# Patient Record
Sex: MALE | Race: WHITE | Employment: STUDENT | URBAN - METROPOLITAN AREA
[De-identification: names, ages, dates, MRNs, and addresses within clinical notes are randomized per-mention and may not be internally consistent; named-entity substitution may affect disease eponyms.]

---
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
## 2020-06-10 DIAGNOSIS — Z11.59 SPECIAL SCREENING EXAMINATION FOR VIRAL DISEASE: Primary | ICD-10-CM

## 2020-06-14 DIAGNOSIS — Z11.59 SPECIAL SCREENING EXAMINATION FOR VIRAL DISEASE: ICD-10-CM

## 2020-06-14 LAB
SARS-COV-2 RNA SPEC QL NAA+PROBE: NOT DETECTED
SPECIMEN SOURCE: NORMAL

## 2020-06-14 NOTE — LETTER
June 16, 2020        Saeid Gaming  [address] [address] [facility] [address]      COVID-19 Antibody, IgG   Date Value Ref Range Status   06/14/2020 Negative NEG^Negative Final     Comment:     Negative results do not rule out SARS-CoV-2 infection, particularly in those   who have been in contact with the virus.  Follow-up testing with a molecular   diagnostic should be considered to rule out infection in these individuals.  Results from antibody testing should not be used as the sole basis to diagnose   or exclude SARS-CoV-2 infection or to inform infection status.           You have tested NEGATIVE for COVID-19 antibodies. This suggests you have not had or been exposed to COVID-19. But it does not mean that for sure.     The test finds antibodies in most people 10 days after they get sick. For some people, it takes longer than 10 days for antibodies to show up. Others may never show antibodies against COVID-19, especially if they have weak immune systems.    If you have COVID-19 symptoms now, please stay home and away from others.     What is antibody testing?    This is a kind of blood test. We take a small sample of your blood, and then test it for something called  antibodies.      Your body makes antibodies to fight infection. If your blood has antibodies for a certain germ, it means you ve been infected with that germ in the past.     Sometimes, antibodies stay in your body for years after you ve had the infection. They can be there even if the germ didn t make you sick. They are a sign that your body fought off the infection.    Will this test find antibodies in everyone who s had COVID-19?    No. The test finds antibodies in most people 10 days after they get sick. For some people, it takes longer than 10 days for antibodies to show up. Others may never show antibodies against COVID-19, especially if they have weak immune systems.    What does it mean if the test finds COVID-19 antibodies?    If we find  these antibodies, it suggests:     This person has had the virus.     Their body s immune system fought the virus.     We don t know if this will help protect someone from getting COVID-19 again. Scientists are still learning about this.    What are the signs of COVID-19?    Signs of COVID-19 can appear from 2 to 14 days (up to 2 weeks) after you re infected. Some people have no symptoms or only mild symptoms. Others get very sick. The most common symptoms are:          Cough    Shortness of breath or trouble breathing  Or at least 2 of these symptoms:    Fever    Chills    Repeated shaking with chills    Muscle pain    Headache    Sore throat    Losing your sense of taste or smell    You may have other symptoms. Please contact your doctor or clinic for any symptoms that worry you.    Where can I get more information?     To learn the [facility] guidelines for staying home, please visit the [place] Department of Health website at [email or web address]/diseases/coronavirus/basics.html    To learn more about COVID-19 and how to care for yourself at home, please visit the CDC website at https://www.cdc.gov/coronavirus/2019-ncov/about/steps-when-sick.html    For more options for care at [facility], please visit our website at [email or web address]    [facility] ([facility]) COVID-19 Hotline:  [phone]

## 2020-06-15 LAB
COVID-19 ANTIBODY IGG: NEGATIVE
LAB TEST METHOD: NORMAL

## 2020-06-16 DIAGNOSIS — Z11.59 SPECIAL SCREENING EXAMINATION FOR VIRAL DISEASE: ICD-10-CM

## 2020-06-16 LAB
SARS-COV-2 RNA SPEC QL NAA+PROBE: NOT DETECTED
SPECIMEN SOURCE: NORMAL

## 2020-07-13 DIAGNOSIS — Z20.822 EXPOSURE TO 2019 NOVEL CORONAVIRUS: Primary | ICD-10-CM

## 2020-07-15 DIAGNOSIS — Z20.822 EXPOSURE TO 2019 NOVEL CORONAVIRUS: ICD-10-CM

## 2020-07-15 NOTE — LETTER
July 20, 2020        Saeid Gaming  [address] [address] [address]    This letter provides a written record that you were tested for COVID-19 on 7/15/20.       Your result was negative. This means that we didn t find the virus that causes COVID-19 in your sample. A test may show negative when you do actually have the virus. This can happen when the virus is in the early stages of infection, before you feel illness symptoms.    If you have symptoms   Stay home and away from others (self-isolate) until you meet ALL of the guidelines below:    You ve had no fever--and no medicine that reduces fever--for 3 full days (72 hours). And      Your other symptoms have gotten better. For example, your cough or breathing has improved. And     At least 10 days have passed since your symptoms started.    During this time:    Stay home. Don t go to work, school or anywhere else.     Stay in your own room, including for meals. Use your own bathroom if you can.    Stay away from others in your home. No hugging, kissing or shaking hands. No visitors.    Clean  high touch  surfaces often (doorknobs, counters, handles, etc.). Use a household cleaning spray or wipes. You can find a full list on the EPA website at www.epa.gov/pesticide-registration/list-n-disinfectants-use-against-sars-cov-2.    Cover your mouth and nose with a mask, tissue or washcloth to avoid spreading germs.    Wash your hands and face often with soap and water.    Going back to work  Check with your employer for any guidelines to follow for going back to work.    Employers: This document serves as formal notice that your employee tested negative for COVID-19, as of the testing date shown above.

## 2020-07-16 LAB
SARS-COV-2 RNA SPEC QL NAA+PROBE: NOT DETECTED
SPECIMEN SOURCE: NORMAL

## 2020-08-06 DIAGNOSIS — Z11.59 SPECIAL SCREENING EXAMINATION FOR VIRAL DISEASE: Primary | ICD-10-CM

## 2020-08-08 DIAGNOSIS — Z11.59 SPECIAL SCREENING EXAMINATION FOR VIRAL DISEASE: ICD-10-CM

## 2020-08-08 LAB
SARS-COV-2 RNA SPEC QL NAA+PROBE: NOT DETECTED
SPECIMEN SOURCE: NORMAL

## 2020-08-10 ENCOUNTER — OFFICE VISIT (OUTPATIENT)
Dept: ORTHOPEDICS | Facility: CLINIC | Age: 20
End: 2020-08-10
Payer: COMMERCIAL

## 2020-08-10 VITALS — HEIGHT: 74 IN | BODY MASS INDEX: 29.52 KG/M2 | WEIGHT: 230 LBS

## 2020-08-10 DIAGNOSIS — B07.8 OTHER VIRAL WARTS: Primary | ICD-10-CM

## 2020-08-10 ASSESSMENT — MIFFLIN-ST. JEOR: SCORE: 2123.02

## 2020-08-10 NOTE — LETTER
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  This has been present for months, possibly more.  Not painful.  He has attempted topical treatments with no clear benefit.    OBJECTIVE:  0.5 cm punctated, verrucous lesion at the distal aspect of the patellar tendon, not tender, not erythematous, multiple darkened capillary beds.    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  We did briefly discuss treatment options including topical treatments, intralesional methods, and cryotherapy.  Saeid is opting for cryotherapy, he can have this done at his earliest convenience at the [facility].    [facility]      Heri Lopez DO

## 2020-08-10 NOTE — LETTER
Date:August 17, 2020      Patient was self referred, no letter generated. Do not send.        [facility] Health Information

## 2020-08-12 NOTE — PROGRESS NOTES
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  This has been present for months, possibly more.  Not painful.  He has attempted topical treatments with no clear benefit.    OBJECTIVE:  0.5 cm punctated, verrucous lesion at the distal aspect of the patellar tendon, not tender, not erythematous, multiple darkened capillary beds.    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  We did briefly discuss treatment options including topical treatments, intralesional methods, and cryotherapy.  Saeid is opting for cryotherapy, he can have this done at [place] [facility]    Quan Lopez DO CAM

## 2020-08-15 DIAGNOSIS — Z11.59 SPECIAL SCREENING EXAMINATION FOR VIRAL DISEASE: Primary | ICD-10-CM

## 2020-08-16 DIAGNOSIS — Z11.59 SPECIAL SCREENING EXAMINATION FOR VIRAL DISEASE: ICD-10-CM

## 2020-08-17 LAB
SARS-COV-2 RNA SPEC QL NAA+PROBE: NOT DETECTED
SPECIMEN SOURCE: NORMAL

## 2020-08-19 ENCOUNTER — OFFICE VISIT (OUTPATIENT)
Dept: ORTHOPEDICS | Facility: CLINIC | Age: 20
End: 2020-08-19
Payer: COMMERCIAL

## 2020-08-19 VITALS
SYSTOLIC BLOOD PRESSURE: 125 MMHG | WEIGHT: 230 LBS | DIASTOLIC BLOOD PRESSURE: 65 MMHG | HEIGHT: 74 IN | BODY MASS INDEX: 29.52 KG/M2 | HEART RATE: 79 BPM

## 2020-08-19 DIAGNOSIS — B07.9 VIRAL WARTS, UNSPECIFIED TYPE: Primary | ICD-10-CM

## 2020-08-19 PROCEDURE — 99207 ZZC NO CHARGE LOS: CPT | Performed by: PREVENTIVE MEDICINE

## 2020-08-19 PROCEDURE — 17110 DESTRUCTION B9 LES UP TO 14: CPT | Performed by: PREVENTIVE MEDICINE

## 2020-08-19 ASSESSMENT — MIFFLIN-ST. JEOR: SCORE: 2123.02

## 2020-08-19 NOTE — PROGRESS NOTES
Saeid presents for left knee wart removal  Wart is about 0.5 cm on anterior patellar tendon area   All lesions are frozen with LN2 x3. Patient tolerated procedure well.       the wart was frozen easily three times with liquid nitrogen.  A total of 1 warts are treated today.  The etiology of common warts were discussed.  s.  Warm soapy water soaks and sanding also recommended.  The patient is to return every two weeks until all warts have    ASSESSMENT:  21 yo male with left knee WART    PLAN:  Wart was frozen and wound dressing applied  WART CARE DISCUSSED. USE OF OTC PRODUCT STARTING IN FEW DAYS. GENTLE ABRAISION WITH PUMICE STONE OR EMERY BOARD WITH GOOD HANDWASHING AFTER. RETURN IN TWO WEEKS FOR REFREEZING UNTIL RESOLVED.

## 2020-08-19 NOTE — LETTER
8/19/2020         RE: Saeid Gaming  [address] [address] [facility] [address]        Dear Colleague,    Thank you for referring your patient, Saeid Gaming, to the [facility]. Please see a copy of my visit note below.    Saeid presents for left knee wart removal  Wart is about 0.5 cm on anterior patellar tendon area   All lesions are frozen with LN2 x3. Patient tolerated procedure well.       the wart was frozen easily three times with liquid nitrogen.  A total of 1 warts are treated today.  The etiology of common warts were discussed.  s.  Warm soapy water soaks and sanding also recommended.  The patient is to return every two weeks until all warts have    ASSESSMENT:  21 yo male with left knee WART    PLAN:  Wart was frozen and wound dressing applied  WART CARE DISCUSSED. USE OF OTC PRODUCT STARTING IN FEW DAYS. GENTLE ABRAISION WITH PUMICE STONE OR EMERY BOARD WITH GOOD HANDWASHING AFTER. RETURN IN TWO WEEKS FOR REFREEZING UNTIL RESOLVED.    Again, thank you for allowing me to participate in the care of your patient.        Sincerely,        Heri Tejada MD

## 2020-08-19 NOTE — PATIENT INSTRUCTIONS
Thanks for coming today.  Ortho[facility] Medicine Clinic  [address] [place] [address]    To schedule future appointments in Ortho Clinic, you may call [phone].    To schedule ordered imaging by your provider:   Call Central Imaging [phone]    To schedule an injection ordered by your provider:  Call Central Imaging Injection scheduling line: [phone]  J[facility] available online at:  [email or web address]    Please call if any further questions or concerns ([phone]).  Clinic hours 8 am to 5 pm.    Return to clinic (call) if symptoms worsen or fail to improve.

## 2020-09-03 DIAGNOSIS — Z11.59 SPECIAL SCREENING EXAMINATION FOR VIRAL DISEASE: Primary | ICD-10-CM

## 2020-09-08 DIAGNOSIS — Z11.59 SPECIAL SCREENING EXAMINATION FOR VIRAL DISEASE: ICD-10-CM

## 2020-09-09 LAB
SARS-COV-2 RNA SPEC QL NAA+PROBE: NOT DETECTED
SPECIMEN SOURCE: NORMAL

## 2020-09-12 DIAGNOSIS — Z11.59 SPECIAL SCREENING EXAMINATION FOR VIRAL DISEASE: Primary | ICD-10-CM

## 2020-09-14 DIAGNOSIS — Z11.59 SPECIAL SCREENING EXAMINATION FOR VIRAL DISEASE: ICD-10-CM

## 2020-09-15 LAB
SARS-COV-2 RNA SPEC QL NAA+PROBE: NOT DETECTED
SPECIMEN SOURCE: NORMAL

## 2020-09-21 DIAGNOSIS — Z11.59 SPECIAL SCREENING EXAMINATION FOR VIRAL DISEASE: ICD-10-CM

## 2020-09-21 LAB
SARS-COV-2 RNA SPEC QL NAA+PROBE: ABNORMAL
SPECIMEN SOURCE: ABNORMAL

## 2020-09-24 ENCOUNTER — VIRTUAL VISIT (OUTPATIENT)
Dept: FAMILY MEDICINE | Facility: CLINIC | Age: 20
End: 2020-09-24
Payer: COMMERCIAL

## 2020-09-24 DIAGNOSIS — U07.1 INFECTION DUE TO 2019 NOVEL CORONAVIRUS: Primary | ICD-10-CM

## 2020-09-24 NOTE — LETTER
"  9/24/2020      RE: Saeid Gaming  [address] [address] [address]       Saeid Gaming is a 20 year old male who is being evaluated via a billable video visit.      The patient has been notified of following:     \"This video visit will be conducted via a call between you and your physician/provider. We have found that certain health care needs can be provided without the need for an in-person physical exam.  This service lets us provide the care you need with a video conversation.  If a prescription is necessary we can send it directly to your pharmacy.  If lab work is needed we can place an order for that and you can then stop by our lab to have the test done at a later time.    Video visits are billed at different rates depending on your insurance coverage.  Please reach out to your insurance provider with any questions.    If during the course of the call the physician/provider feels a video visit is not appropriate, you will not be charged for this service.\"    Patient has given verbal consent for Video visit?YES  How would you like to obtain your AVS? Eris                Type of service:  Video Visit     Video Start Time: 335pm  Video End Time: 349pm  Originating Location (pt. Location): Home     Distant Location (provider location):  [facility]      Platform used for Video Visit: Zoom     Video visit for pos covid test.  No major concerns.  Feels fine.  Athlete feeling good with minimal symptoms.  No SOB or chest pain.  No other concerns or questions.  Reviewed timeline of isolation.  Testing that will be done and gradual ramp up for the [employer] 21 day total post   COVID protocol.  Will continue to follow closely.  SA will call if any concerns.      Heri Orozco MD    "

## 2020-09-24 NOTE — PROGRESS NOTES
"Saeid Gaming is a 20 year old male who is being evaluated via a billable video visit.      The patient has been notified of following:     \"This video visit will be conducted via a call between you and your physician/provider. We have found that certain health care needs can be provided without the need for an in-person physical exam.  This service lets us provide the care you need with a video conversation.  If a prescription is necessary we can send it directly to your pharmacy.  If lab work is needed we can place an order for that and you can then stop by our lab to have the test done at a later time.    Video visits are billed at different rates depending on your insurance coverage.  Please reach out to your insurance provider with any questions.    If during the course of the call the physician/provider feels a video visit is not appropriate, you will not be charged for this service.\"    Patient has given verbal consent for Video visit?YES  How would you like to obtain your AVS? MyChart              "

## 2020-09-24 NOTE — LETTER
Date:October 1, 2020      Patient was self referred, no letter generated. Do not send.        [facility] Health Information

## 2020-09-30 NOTE — PROGRESS NOTES
Type of service:  Video Visit     Video Start Time: 335pm  Video End Time: 349pm  Originating Location (pt. Location): Home     Distant Location (provider location):  [facility]      Platform used for Video Visit: Zoom     Video visit for pos covid test.  No major concerns.  Feels fine.  Athlete feeling good with minimal symptoms.  No SOB or chest pain.  No other concerns or questions.  Reviewed timeline of isolation.  Testing that will be done and gradual ramp up for the [employer] 21 day total post   COVID protocol.  Will continue to follow closely.  SA will call if any concerns.

## 2020-10-01 ENCOUNTER — HOSPITAL ENCOUNTER (OUTPATIENT)
Dept: CARDIOLOGY | Facility: CLINIC | Age: 20
Discharge: HOME | End: 2020-10-01
Attending: PREVENTIVE MEDICINE | Admitting: PREVENTIVE MEDICINE
Payer: COMMERCIAL

## 2020-10-01 DIAGNOSIS — U07.1 CLINICAL DIAGNOSIS OF COVID-19: ICD-10-CM

## 2020-10-01 DIAGNOSIS — Z11.59 SPECIAL SCREENING EXAMINATION FOR VIRAL DISEASE: ICD-10-CM

## 2020-10-01 LAB — TROPONIN I SERPL-MCNC: <0.015 UG/L (ref 0–0.04)

## 2020-10-01 PROCEDURE — 36415 COLL VENOUS BLD VENIPUNCTURE: CPT | Performed by: PATHOLOGY

## 2020-10-01 PROCEDURE — 84484 ASSAY OF TROPONIN QUANT: CPT | Performed by: PATHOLOGY

## 2020-10-01 PROCEDURE — 93010 ELECTROCARDIOGRAM REPORT: CPT | Performed by: INTERNAL MEDICINE

## 2020-10-01 PROCEDURE — 93306 TTE W/DOPPLER COMPLETE: CPT | Mod: 26 | Performed by: INTERNAL MEDICINE

## 2020-10-01 PROCEDURE — 93306 TTE W/DOPPLER COMPLETE: CPT

## 2020-10-01 PROCEDURE — 86769 SARS-COV-2 COVID-19 ANTIBODY: CPT | Mod: 90 | Performed by: PATHOLOGY

## 2020-10-01 PROCEDURE — 99000 SPECIMEN HANDLING OFFICE-LAB: CPT | Performed by: PATHOLOGY

## 2020-10-02 ENCOUNTER — APPOINTMENT (OUTPATIENT)
Dept: LAB | Facility: CLINIC | Age: 20
End: 2020-10-02
Payer: COMMERCIAL

## 2020-10-02 LAB
COVID-19 ANTIBODY IGG: POSITIVE
INTERPRETATION ECG - MUSE: NORMAL
LAB TEST METHOD: ABNORMAL

## 2020-10-05 ENCOUNTER — HOSPITAL ENCOUNTER (OUTPATIENT)
Dept: CARDIOLOGY | Facility: CLINIC | Age: 20
Discharge: HOME | End: 2020-10-05
Attending: PREVENTIVE MEDICINE | Admitting: PREVENTIVE MEDICINE
Payer: COMMERCIAL

## 2020-10-05 DIAGNOSIS — U07.1 CLINICAL DIAGNOSIS OF COVID-19: ICD-10-CM

## 2020-10-05 LAB
COVID-19 ANTIBODY IGG: POSITIVE
LAB TEST METHOD: ABNORMAL

## 2020-10-05 PROCEDURE — 75561 CARDIAC MRI FOR MORPH W/DYE: CPT

## 2020-10-05 PROCEDURE — 75561 CARDIAC MRI FOR MORPH W/DYE: CPT | Mod: 26 | Performed by: INTERNAL MEDICINE

## 2020-10-05 PROCEDURE — 255N000002 HC RX 255 OP 636: Performed by: PREVENTIVE MEDICINE

## 2020-10-05 PROCEDURE — A9585 GADOBUTROL INJECTION: HCPCS | Performed by: PREVENTIVE MEDICINE

## 2020-10-05 RX ORDER — DIPHENHYDRAMINE HCL 25 MG
25 CAPSULE ORAL
Status: DISCONTINUED | OUTPATIENT
Start: 2020-10-05 | End: 2020-10-06 | Stop reason: HOSPADM

## 2020-10-05 RX ORDER — ONDANSETRON 2 MG/ML
4 INJECTION INTRAMUSCULAR; INTRAVENOUS
Status: DISCONTINUED | OUTPATIENT
Start: 2020-10-05 | End: 2020-10-06 | Stop reason: HOSPADM

## 2020-10-05 RX ORDER — AMINOPHYLLINE 25 MG/ML
100 INJECTION, SOLUTION INTRAVENOUS ONCE
Status: DISCONTINUED | OUTPATIENT
Start: 2020-10-05 | End: 2020-10-06 | Stop reason: HOSPADM

## 2020-10-05 RX ORDER — ALBUTEROL SULFATE 90 UG/1
2 AEROSOL, METERED RESPIRATORY (INHALATION) EVERY 5 MIN PRN
Status: DISCONTINUED | OUTPATIENT
Start: 2020-10-05 | End: 2020-10-06 | Stop reason: HOSPADM

## 2020-10-05 RX ORDER — ACYCLOVIR 200 MG/1
0-1 CAPSULE ORAL
Status: DISCONTINUED | OUTPATIENT
Start: 2020-10-05 | End: 2020-10-06 | Stop reason: HOSPADM

## 2020-10-05 RX ORDER — DIAZEPAM 5 MG
5 TABLET ORAL EVERY 30 MIN PRN
Status: DISCONTINUED | OUTPATIENT
Start: 2020-10-05 | End: 2020-10-06 | Stop reason: HOSPADM

## 2020-10-05 RX ORDER — METHYLPREDNISOLONE SODIUM SUCCINATE 125 MG/2ML
125 INJECTION, POWDER, LYOPHILIZED, FOR SOLUTION INTRAMUSCULAR; INTRAVENOUS
Status: DISCONTINUED | OUTPATIENT
Start: 2020-10-05 | End: 2020-10-06 | Stop reason: HOSPADM

## 2020-10-05 RX ORDER — GADOBUTROL 604.72 MG/ML
14 INJECTION INTRAVENOUS ONCE
Status: COMPLETED | OUTPATIENT
Start: 2020-10-05 | End: 2020-10-05

## 2020-10-05 RX ORDER — DIPHENHYDRAMINE HYDROCHLORIDE 50 MG/ML
25-50 INJECTION INTRAMUSCULAR; INTRAVENOUS
Status: DISCONTINUED | OUTPATIENT
Start: 2020-10-05 | End: 2020-10-06 | Stop reason: HOSPADM

## 2020-10-05 RX ORDER — REGADENOSON 0.08 MG/ML
0.4 INJECTION, SOLUTION INTRAVENOUS ONCE
Status: DISCONTINUED | OUTPATIENT
Start: 2020-10-05 | End: 2020-10-06 | Stop reason: HOSPADM

## 2020-10-05 RX ADMIN — GADOBUTROL 14 ML: 604.72 INJECTION INTRAVENOUS at 16:47

## 2020-10-05 NOTE — PROGRESS NOTES
Noelle Mtz MD  P  [employer]             CORE - T2 mapping    Contrast administered per weight based protocol.

## 2020-10-06 ENCOUNTER — OFFICE VISIT (OUTPATIENT)
Dept: FAMILY MEDICINE | Facility: CLINIC | Age: 20
End: 2020-10-06
Payer: COMMERCIAL

## 2020-10-06 VITALS
HEART RATE: 84 BPM | DIASTOLIC BLOOD PRESSURE: 77 MMHG | SYSTOLIC BLOOD PRESSURE: 119 MMHG | HEIGHT: 75 IN | WEIGHT: 240 LBS | BODY MASS INDEX: 29.84 KG/M2

## 2020-10-06 DIAGNOSIS — U07.1 CLINICAL DIAGNOSIS OF COVID-19: Primary | ICD-10-CM

## 2020-10-06 ASSESSMENT — MIFFLIN-ST. JEOR: SCORE: 2184.26

## 2020-10-06 NOTE — LETTER
10/6/2020      RE: Saeid Gaming  [address] [address] [facility] [address] [facility] [address]       HISTORY OF PRESENT ILLNESS  Mr. Gaming is a pleasant 20 year old year old male who follows up for covid positive testing  Asymptomatic  Completed isolation  Feels well  No fevers or chills  No concerns today  Completed cardiac screening    MEDICAL HISTORY  There is no problem list on file for this patient.      Current Outpatient Medications   Medication Sig Dispense Refill     azithromycin (ZITHROMAX) 250 MG tablet Two tablets first day, then one tablet daily for four days. 6 tablet 0     cetirizine-psuedoePHEDrine (ZYRTEC-D) 5-120 MG per 12 hr tablet Take 1 tablet by mouth 2 times daily       fluticasone (FLONASE) 50 MCG/ACT spray Spray 1-2 sprays into both nostrils daily 1 Bottle 11     fluticasone (VERAMYST) 27.5 MCG/SPRAY spray Spray 2 sprays into both nostrils daily       montelukast (SINGULAIR) 10 MG tablet Take 1 tablet (10 mg) by mouth At Bedtime 90 tablet 1     oseltamivir (TAMIFLU) 75 MG capsule Take 1 capsule (75 mg) by mouth 2 times daily 10 capsule 0     tobramycin (TOBREX) 0.3 % ophthalmic solution Place 1-2 drops Into the left eye every 2 hours 1 Bottle 0       Allergies   Allergen Reactions     Penicillins Hives     Seasonal Allergies        No family history on file.  Social History     Socioeconomic History     Marital status: Single     Spouse name: Not on file     Number of children: Not on file     Years of education: Not on file     Highest education level: Not on file   Occupational History     Not on file   Social Needs     Financial resource strain: Not on file     Food insecurity     Worry: Not on file     Inability: Not on file     Transportation needs     Medical: Not on file     Non-medical: Not on file   Tobacco Use     Smoking status: Never Smoker     Smokeless tobacco: Never Used   Substance and Sexual Activity     Alcohol use: Not on file     Drug use: Not on file     Sexual activity: Not on file  "  Lifestyle     Physical activity     Days per week: Not on file     Minutes per session: Not on file     Stress: Not on file   Relationships     Social connections     Talks on phone: Not on file     Gets together: Not on file     Attends [employer] service: Not on file     Active member of club or organization: Not on file     Attends meetings of clubs or organizations: Not on file     Relationship status: Not on file     Intimate partner violence     Fear of current or ex partner: Not on file     Emotionally abused: Not on file     Physically abused: Not on file     Forced sexual activity: Not on file   Other Topics Concern     Not on file   Social History Narrative     Not on file       Additional medical/Social/Surgical histories reviewed in [facility] and updated as appropriate.     REVIEW OF SYSTEMS (10/6/2020)  10 point ROS of systems including Constitutional, Eyes, Respiratory, Cardiovascular, Gastroenterology, Genitourinary, Integumentary, Musculoskeletal, Psychiatric, Allergic/Immunologic were all negative except for pertinent positives noted in my HPI.     PHYSICAL EXAM  Vitals:    10/06/20 0703   BP: 119/77   Pulse: 84   Weight: 108.9 kg (240 lb)   Height: 1.905 m (6' 3\")     Exam:  Constitutional: healthy, alert and no distress  Head: Normocephalic. No masses, lesions, tenderness or abnormalities  Neck: Neck supple. No adenopathy. Thyroid symmetric, normal size,, Carotids without bruits.  ENT: ENT exam normal, no neck nodes or sinus tenderness  Cardiovascular: negative, PMI normal. No lifts, heaves, or thrills. RRR. No murmurs, clicks gallops or rub  Respiratory: negative, Percussion normal. Good diaphragmatic excursion. Lungs clear  Gastrointestinal: Abdomen soft, non-tender. BS normal. No masses, organomegaly  Musculoskeletal: extremities normal- no gross deformities noted, gait normal and normal muscle tone  Skin: no suspicious lesions or rashes  Neurologic: Gait normal. Reflexes normal and symmetric. " Sensation grossly WNL.  Psychiatric: mentation appears normal and affect normal/bright  Hematologic/Lymphatic/Immunologic: Normal cervical lymph nodes  ASSESSMENT & PLAN  21 yo male with history of covid positive testing and positive serology  Asymptomatic  Completed isolation  Completed cardiac screening, WNL  Feels well  No complaints today  F/u PRN  Discussed RTP per [facility] protocol for post covid infection    Appropriate PPE was utilized for prevention of spread of Covid-19.  Heri Tejada MD, CAM        Heri Tejada MD

## 2020-10-06 NOTE — LETTER
Date:October 7, 2020      Patient was self referred, no letter generated. Do not send.        [facility] Health Information

## 2020-10-06 NOTE — PROGRESS NOTES
HISTORY OF PRESENT ILLNESS  Mr. Gaming is a pleasant 20 year old year old male who follows up for covid positive testing  Asymptomatic  Completed isolation  Feels well  No fevers or chills  No concerns today  Completed cardiac screening    MEDICAL HISTORY  There is no problem list on file for this patient.      Current Outpatient Medications   Medication Sig Dispense Refill     azithromycin (ZITHROMAX) 250 MG tablet Two tablets first day, then one tablet daily for four days. 6 tablet 0     cetirizine-psuedoePHEDrine (ZYRTEC-D) 5-120 MG per 12 hr tablet Take 1 tablet by mouth 2 times daily       fluticasone (FLONASE) 50 MCG/ACT spray Spray 1-2 sprays into both nostrils daily 1 Bottle 11     fluticasone (VERAMYST) 27.5 MCG/SPRAY spray Spray 2 sprays into both nostrils daily       montelukast (SINGULAIR) 10 MG tablet Take 1 tablet (10 mg) by mouth At Bedtime 90 tablet 1     oseltamivir (TAMIFLU) 75 MG capsule Take 1 capsule (75 mg) by mouth 2 times daily 10 capsule 0     tobramycin (TOBREX) 0.3 % ophthalmic solution Place 1-2 drops Into the left eye every 2 hours 1 Bottle 0       Allergies   Allergen Reactions     Penicillins Hives     Seasonal Allergies        No family history on file.  Social History     Socioeconomic History     Marital status: Single     Spouse name: Not on file     Number of children: Not on file     Years of education: Not on file     Highest education level: Not on file   Occupational History     Not on file   Social Needs     Financial resource strain: Not on file     Food insecurity     Worry: Not on file     Inability: Not on file     Transportation needs     Medical: Not on file     Non-medical: Not on file   Tobacco Use     Smoking status: Never Smoker     Smokeless tobacco: Never Used   Substance and Sexual Activity     Alcohol use: Not on file     Drug use: Not on file     Sexual activity: Not on file   Lifestyle     Physical activity     Days per week: Not on file     Minutes per session:  "Not on file     Stress: Not on file   Relationships     Social connections     Talks on phone: Not on file     Gets together: Not on file     Attends [employer]ism service: Not on file     Active member of club or organization: Not on file     Attends meetings of clubs or organizations: Not on file     Relationship status: Not on file     Intimate partner violence     Fear of current or ex partner: Not on file     Emotionally abused: Not on file     Physically abused: Not on file     Forced sexual activity: Not on file   Other Topics Concern     Not on file   Social History Narrative     Not on file       Additional medical/Social/Surgical histories reviewed in [facility] and updated as appropriate.     REVIEW OF SYSTEMS (10/6/2020)  10 point ROS of systems including Constitutional, Eyes, Respiratory, Cardiovascular, Gastroenterology, Genitourinary, Integumentary, Musculoskeletal, Psychiatric, Allergic/Immunologic were all negative except for pertinent positives noted in my HPI.     PHYSICAL EXAM  Vitals:    10/06/20 0703   BP: 119/77   Pulse: 84   Weight: 108.9 kg (240 lb)   Height: 1.905 m (6' 3\")     Exam:  Constitutional: healthy, alert and no distress  Head: Normocephalic. No masses, lesions, tenderness or abnormalities  Neck: Neck supple. No adenopathy. Thyroid symmetric, normal size,, Carotids without bruits.  ENT: ENT exam normal, no neck nodes or sinus tenderness  Cardiovascular: negative, PMI normal. No lifts, heaves, or thrills. RRR. No murmurs, clicks gallops or rub  Respiratory: negative, Percussion normal. Good diaphragmatic excursion. Lungs clear  Gastrointestinal: Abdomen soft, non-tender. BS normal. No masses, organomegaly  Musculoskeletal: extremities normal- no gross deformities noted, gait normal and normal muscle tone  Skin: no suspicious lesions or rashes  Neurologic: Gait normal. Reflexes normal and symmetric. Sensation grossly WNL.  Psychiatric: mentation appears normal and affect " normal/bright  Hematologic/Lymphatic/Immunologic: Normal cervical lymph nodes  ASSESSMENT & PLAN  19 yo male with history of covid positive testing and positive serology  Asymptomatic  Completed isolation  Completed cardiac screening, WNL  Feels well  No complaints today  F/u PRN  Discussed RTP per [facility] protocol for post covid infection    Appropriate PPE was utilized for prevention of spread of Covid-19.  Heri Tejada MD, CAQSM

## 2020-12-17 DIAGNOSIS — Z11.59 SPECIAL SCREENING EXAMINATION FOR VIRAL DISEASE: ICD-10-CM

## 2020-12-18 LAB
COVID-19 ANTIBODY IGG: POSITIVE
LAB TEST METHOD: ABNORMAL

## 2021-01-03 ENCOUNTER — HEALTH MAINTENANCE LETTER (OUTPATIENT)
Age: 21
End: 2021-01-03

## 2021-02-03 ENCOUNTER — OFFICE VISIT (OUTPATIENT)
Dept: ORTHOPEDICS | Facility: CLINIC | Age: 21
End: 2021-02-03
Payer: COMMERCIAL

## 2021-02-03 DIAGNOSIS — J30.89 NON-SEASONAL ALLERGIC RHINITIS, UNSPECIFIED TRIGGER: Primary | ICD-10-CM

## 2021-02-03 RX ORDER — MONTELUKAST SODIUM 10 MG/1
10 TABLET ORAL AT BEDTIME
Qty: 30 TABLET | Refills: 3 | Status: SHIPPED | OUTPATIENT
Start: 2021-02-03 | End: 2021-08-16

## 2021-02-03 RX ORDER — CETIRIZINE HYDROCHLORIDE, PSEUDOEPHEDRINE HYDROCHLORIDE 5; 120 MG/1; MG/1
1 TABLET, FILM COATED, EXTENDED RELEASE ORAL 2 TIMES DAILY
Qty: 60 TABLET | Refills: 3 | Status: SHIPPED | OUTPATIENT
Start: 2021-02-03 | End: 2021-08-16

## 2021-02-03 RX ORDER — FLUTICASONE PROPIONATE 50 MCG
1 SPRAY, SUSPENSION (ML) NASAL DAILY
Qty: 11.1 ML | Refills: 3 | Status: SHIPPED | OUTPATIENT
Start: 2021-02-03

## 2021-02-03 NOTE — PROGRESS NOTES
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  Saeid has a long history of allergic rhinitis.  He feels symptoms of nasal swelling, drainage of the back of his throat, and a scratchy throat with phlegm production.  These are worse in the cold months and the transition months of fall and spring.  He has tried multiple treatment regimens over the years and has found that he has got great efficacy with various things, based upon the seasons.  He takes Zyrtec-D, which helps with the majority of his symptoms.  He has been told to take this twice a day on occasion, during seasons when his allergies are heavy.  He has also tried Flonase, he was on this for over a year in high school.  This worked well for him.  He has also tried Singulair, although not many months.  He is interested in getting back on these.        Additional history: as documented    MEDICAL HISTORY  There is no problem list on file for this patient.      Current Outpatient Medications   Medication Sig Dispense Refill     azithromycin (ZITHROMAX) 250 MG tablet Two tablets first day, then one tablet daily for four days. 6 tablet 0     cetirizine-psuedoePHEDrine (ZYRTEC-D) 5-120 MG per 12 hr tablet Take 1 tablet by mouth 2 times daily       fluticasone (FLONASE) 50 MCG/ACT spray Spray 1-2 sprays into both nostrils daily 1 Bottle 11     fluticasone (VERAMYST) 27.5 MCG/SPRAY spray Spray 2 sprays into both nostrils daily       montelukast (SINGULAIR) 10 MG tablet Take 1 tablet (10 mg) by mouth At Bedtime 90 tablet 1     oseltamivir (TAMIFLU) 75 MG capsule Take 1 capsule (75 mg) by mouth 2 times daily 10 capsule 0     tobramycin (TOBREX) 0.3 % ophthalmic solution Place 1-2 drops Into the left eye every 2 hours 1 Bottle 0       Allergies   Allergen Reactions     Penicillins Hives     Seasonal Allergies        No family history on file.    Additional medical/Social/Surgical histories reviewed in [facility] and  updated as appropriate.        PHYSICAL EXAM  There were no vitals filed for this visit.  Physical Exam  Constitutional:       Appearance: Normal appearance.   HENT:      Nose: No congestion or rhinorrhea.      Mouth/Throat:      Mouth: Mucous membranes are moist.      Pharynx: Posterior oropharyngeal erythema present. No oropharyngeal exudate.   Eyes:      Conjunctiva/sclera: Conjunctivae normal.   Cardiovascular:      Rate and Rhythm: Normal rate and regular rhythm.      Pulses: Normal pulses.      Heart sounds: Normal heart sounds.   Pulmonary:      Effort: Pulmonary effort is normal.   Skin:     Capillary Refill: Capillary refill takes less than 2 seconds.   Neurological:      General: No focal deficit present.      Mental Status: He is alert.   Psychiatric:         Mood and Affect: Mood normal.         Behavior: Behavior normal.                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    I had a good discussion with Saeid centering around treatment regimens for allergic rhinitis.  His treatment regimen is working well, he does know what works for him in certain months.  I am refilling his Zyrtec-D for him, I also placed him prescriptions for Flonase and Singulair.  If these are working well we can follow-up as needed for these and other issues.    It was a pleasure seeing Saeid today.    Heri Lopez DO, CAM  Primary Care Sports Medicine      This note was constructed using Dragon dictation software, please excuse any minor errors in spelling, grammar, or syntax.

## 2021-02-03 NOTE — LETTER
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  Saeid has a long history of allergic rhinitis.  He feels symptoms of nasal swelling, drainage of the back of his throat, and a scratchy throat with phlegm production.  These are worse in the cold months and the transition months of fall and spring.  He has tried multiple treatment regimens over the years and has found that he has got great efficacy with various things, based upon the seasons.  He takes Zyrtec-D, which helps with the majority of his symptoms.  He has been told to take this twice a day on occasion, during seasons when his allergies are heavy.  He has also tried Flonase, he was on this for over a year in high school.  This worked well for him.  He has also tried Singulair, although not many months.  He is interested in getting back on these.        Additional history: as documented    MEDICAL HISTORY  There is no problem list on file for this patient.      Current Outpatient Medications   Medication Sig Dispense Refill     azithromycin (ZITHROMAX) 250 MG tablet Two tablets first day, then one tablet daily for four days. 6 tablet 0     cetirizine-psuedoePHEDrine (ZYRTEC-D) 5-120 MG per 12 hr tablet Take 1 tablet by mouth 2 times daily       fluticasone (FLONASE) 50 MCG/ACT spray Spray 1-2 sprays into both nostrils daily 1 Bottle 11     fluticasone (VERAMYST) 27.5 MCG/SPRAY spray Spray 2 sprays into both nostrils daily       montelukast (SINGULAIR) 10 MG tablet Take 1 tablet (10 mg) by mouth At Bedtime 90 tablet 1     oseltamivir (TAMIFLU) 75 MG capsule Take 1 capsule (75 mg) by mouth 2 times daily 10 capsule 0     tobramycin (TOBREX) 0.3 % ophthalmic solution Place 1-2 drops Into the left eye every 2 hours 1 Bottle 0       Allergies   Allergen Reactions     Penicillins Hives     Seasonal Allergies        No family history  on file.    Additional medical/Social/Surgical histories reviewed in [facility] and updated as appropriate.        PHYSICAL EXAM  There were no vitals filed for this visit.  Physical Exam  Constitutional:       Appearance: Normal appearance.   HENT:      Nose: No congestion or rhinorrhea.      Mouth/Throat:      Mouth: Mucous membranes are moist.      Pharynx: Posterior oropharyngeal erythema present. No oropharyngeal exudate.   Eyes:      Conjunctiva/sclera: Conjunctivae normal.   Cardiovascular:      Rate and Rhythm: Normal rate and regular rhythm.      Pulses: Normal pulses.      Heart sounds: Normal heart sounds.   Pulmonary:      Effort: Pulmonary effort is normal.   Skin:     Capillary Refill: Capillary refill takes less than 2 seconds.   Neurological:      General: No focal deficit present.      Mental Status: He is alert.   Psychiatric:         Mood and Affect: Mood normal.         Behavior: Behavior normal.                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    I had a good discussion with Saeid centering around treatment regimens for allergic rhinitis.  His treatment regimen is working well, he does know what works for him in certain months.  I am refilling his Zyrtec-D for him, I also placed him prescriptions for Flonase and Singulair.  If these are working well we can follow-up as needed for these and other issues.    It was a pleasure seeing Saeid today.    Heri Lopez DO, CAM  Primary Care Sports Medicine      This note was constructed using Dragon dictation software, please excuse any minor errors in spelling, grammar, or syntax.        Heri Lopez DO

## 2021-03-05 ENCOUNTER — VIRTUAL VISIT (OUTPATIENT)
Dept: FAMILY MEDICINE | Facility: CLINIC | Age: 21
End: 2021-03-05
Payer: COMMERCIAL

## 2021-03-05 DIAGNOSIS — J01.00 ACUTE NON-RECURRENT MAXILLARY SINUSITIS: Primary | ICD-10-CM

## 2021-03-05 NOTE — LETTER
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    ROS:  As noted above in HPI otherwise negative.    There is no problem list on file for this patient.  c    Current Outpatient Medications   Medication Sig Dispense Refill     azithromycin (ZITHROMAX Z-FAB) 250 MG tablet Take 1 tablet (250 mg) by mouth daily for 5 days Take Z-pack as directed 5 tablet 0     azithromycin (ZITHROMAX) 250 MG tablet Two tablets first day, then one tablet daily for four days. 6 tablet 0     cetirizine-pseudoePHEDrine ER (ZYRTEC-D) 5-120 MG 12 hr tablet Take 1 tablet by mouth 2 times daily 60 tablet 3     cetirizine-psuedoePHEDrine (ZYRTEC-D) 5-120 MG per 12 hr tablet Take 1 tablet by mouth 2 times daily       fluticasone (FLONASE) 50 MCG/ACT nasal spray Spray 1 spray into both nostrils daily 11.1 mL 3     fluticasone (FLONASE) 50 MCG/ACT spray Spray 1-2 sprays into both nostrils daily 1 Bottle 11     fluticasone (VERAMYST) 27.5 MCG/SPRAY spray Spray 2 sprays into both nostrils daily       montelukast (SINGULAIR) 10 MG tablet Take 1 tablet (10 mg) by mouth At Bedtime 30 tablet 3     montelukast (SINGULAIR) 10 MG tablet Take 1 tablet (10 mg) by mouth At Bedtime 90 tablet 1     oseltamivir (TAMIFLU) 75 MG capsule Take 1 capsule (75 mg) by mouth 2 times daily 10 capsule 0     tobramycin (TOBREX) 0.3 % ophthalmic solution Place 1-2 drops Into the left eye every 2 hours 1 Bottle 0              OBJECTIVE:     There were no vitals taken for  "this visit.  Estimated body mass index is 30.12 kg/m  as calculated from the following:    Height as of an earlier encounter on 3/5/21: 1.905 m (6' 3\").    Weight as of an earlier encounter on 3/5/21: 109.3 kg (241 lb).      GENERAL: Healthy, alert and no distress  EYES: Eyes grossly normal to inspection.  No discharge or erythema, or obvious scleral/conjunctival abnormalities.  RESP: No audible wheeze, cough, or visible cyanosis.  No visible retractions or increased work of breathing.    SKIN: Visible skin clear. No significant rash, abnormal pigmentation or lesions.  NEURO: Cranial nerves grossly intact.  Mentation and speech appropriate for age.  PSYCH: Mentation appears normal, affect normal/bright, judgement and insight intact, normal speech and appearance well-groomed.             ASSESSMENT & PLAN:      22 yo male with sinusitis maxillary and congestion  Restart flonase, mucinex d and Z pack sent as RX  Stay well hydrated  F/u in 2-3 days if not improving    Options for treatment and/or follow-up care were reviewed with the patient and Morgan. Patient was actively involved in the decision making process. Patient verbalized understanding and was in agreement with the plan.    The patient was seen by and discussed with ATC    Heri Tejada MD        -------------------------------------------------------------------------------------------------------------  Video-Visit Details    Type of service:  Video Visit    Video Start Time: 9:49am    Video End Time (time video stopped): 10:00am    Total Visit time: 11    Originating Location (pt. Location): Home     Distant Location (provider location):  [facility]      Platform used for Video Visit: vel Tejada MD    "

## 2021-03-05 NOTE — LETTER
Date:March 8, 2021      Patient was self referred, no letter generated. Do not send.        [facility] Health Information

## 2021-03-06 NOTE — PROGRESS NOTES
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    ROS:  As noted above in HPI otherwise negative.    There is no problem list on file for this patient.  c    Current Outpatient Medications   Medication Sig Dispense Refill     azithromycin (ZITHROMAX Z-AFB) 250 MG tablet Take 1 tablet (250 mg) by mouth daily for 5 days Take Z-pack as directed 5 tablet 0     azithromycin (ZITHROMAX) 250 MG tablet Two tablets first day, then one tablet daily for four days. 6 tablet 0     cetirizine-pseudoePHEDrine ER (ZYRTEC-D) 5-120 MG 12 hr tablet Take 1 tablet by mouth 2 times daily 60 tablet 3     cetirizine-psuedoePHEDrine (ZYRTEC-D) 5-120 MG per 12 hr tablet Take 1 tablet by mouth 2 times daily       fluticasone (FLONASE) 50 MCG/ACT nasal spray Spray 1 spray into both nostrils daily 11.1 mL 3     fluticasone (FLONASE) 50 MCG/ACT spray Spray 1-2 sprays into both nostrils daily 1 Bottle 11     fluticasone (VERAMYST) 27.5 MCG/SPRAY spray Spray 2 sprays into both nostrils daily       montelukast (SINGULAIR) 10 MG tablet Take 1 tablet (10 mg) by mouth At Bedtime 30 tablet 3     montelukast (SINGULAIR) 10 MG tablet Take 1 tablet (10 mg) by mouth At Bedtime 90 tablet 1     oseltamivir (TAMIFLU) 75 MG capsule Take 1 capsule (75 mg) by mouth 2 times daily 10 capsule 0     tobramycin (TOBREX) 0.3 % ophthalmic solution Place 1-2 drops Into the left eye every 2 hours 1 Bottle 0              OBJECTIVE:     There were no vitals taken for this visit.  Estimated body mass index is 30.12 kg/m  as calculated from the  "following:    Height as of an earlier encounter on 3/5/21: 1.905 m (6' 3\").    Weight as of an earlier encounter on 3/5/21: 109.3 kg (241 lb).      GENERAL: Healthy, alert and no distress  EYES: Eyes grossly normal to inspection.  No discharge or erythema, or obvious scleral/conjunctival abnormalities.  RESP: No audible wheeze, cough, or visible cyanosis.  No visible retractions or increased work of breathing.    SKIN: Visible skin clear. No significant rash, abnormal pigmentation or lesions.  NEURO: Cranial nerves grossly intact.  Mentation and speech appropriate for age.  PSYCH: Mentation appears normal, affect normal/bright, judgement and insight intact, normal speech and appearance well-groomed.             ASSESSMENT & PLAN:      22 yo male with sinusitis maxillary and congestion  Restart flonase, mucinex d and Z pack sent as RX  Stay well hydrated  F/u in 2-3 days if not improving    Options for treatment and/or follow-up care were reviewed with the patient and , Morgan. Patient was actively involved in the decision making process. Patient verbalized understanding and was in agreement with the plan.    The patient was seen by and discussed with ATC    Heri Tejada MD        -------------------------------------------------------------------------------------------------------------  Video-Visit Details    Type of service:  Video Visit    Video Start Time: 9:49am    Video End Time (time video stopped): 10:00am    Total Visit time: 11    Originating Location (pt. Location): Home     Distant Location (provider location):  [facility]      Platform used for Video Visit: vel"

## 2021-06-11 ENCOUNTER — OFFICE VISIT (OUTPATIENT)
Dept: ORTHOPEDICS | Facility: CLINIC | Age: 21
End: 2021-06-11
Payer: COMMERCIAL

## 2021-06-11 DIAGNOSIS — B07.9 VIRAL WARTS, UNSPECIFIED TYPE: Primary | ICD-10-CM

## 2021-06-11 PROCEDURE — 17110 DESTRUCTION B9 LES UP TO 14: CPT | Performed by: PREVENTIVE MEDICINE

## 2021-06-11 NOTE — PROGRESS NOTES
Saeid presents for left knee wart  He has had this in the past. It has returned.  Has had this wart treated in the past about 11 months prior. Started to return recently    Wart is about 0.5 cm on anterior patellar tendon area   All lesions are frozen with LN2 x3. Patient tolerated procedure well.         the wart was frozen easily three times with liquid nitrogen.  A total of 1 warts are treated today.  The etiology of common warts were discussed.  s.  Warm soapy water soaks and sanding also recommended.  The patient is to return every two weeks until all warts have     ASSESSMENT:  19 yo male with left knee WART for removal     PLAN:  Wart was frozen and wound dressing applied  WART CARE DISCUSSED. USE OF OTC PRODUCT STARTING IN FEW DAYS. GENTLE ABRAISION WITH PUMICE STONE OR EMERY BOARD WITH GOOD HANDWASHING AFTER. RETURN IN TWO WEEKS FOR REFREEZING UNTIL RESOLVED.    Dr Tejada

## 2021-06-11 NOTE — LETTER
6/11/2021      RE: Saeid Gaming  [address]  [place] [address]       Saeid presents for left knee wart  He has had this in the past. It has returned.  Has had this wart treated in the past about 11 months prior. Started to return recently    Wart is about 0.5 cm on anterior patellar tendon area   All lesions are frozen with LN2 x3. Patient tolerated procedure well.         the wart was frozen easily three times with liquid nitrogen.  A total of 1 warts are treated today.  The etiology of common warts were discussed.  s.  Warm soapy water soaks and sanding also recommended.  The patient is to return every two weeks until all warts have     ASSESSMENT:  21 yo male with left knee WART for removal     PLAN:  Wart was frozen and wound dressing applied  WART CARE DISCUSSED. USE OF OTC PRODUCT STARTING IN FEW DAYS. GENTLE ABRAISION WITH PUMICE STONE OR EMERY BOARD WITH GOOD HANDWASHING AFTER. RETURN IN TWO WEEKS FOR REFREEZING UNTIL RESOLVED.    Dr Terrell Tejada MD

## 2021-06-11 NOTE — LETTER
Date:Yuridia 15, 2021      Patient was self referred, no letter generated. Do not send.        [facility] Health Information

## 2021-08-25 ENCOUNTER — OFFICE VISIT (OUTPATIENT)
Dept: ORTHOPEDICS | Facility: CLINIC | Age: 21
End: 2021-08-25
Payer: COMMERCIAL

## 2021-08-25 DIAGNOSIS — B07.9 VIRAL WART ON FINGER: ICD-10-CM

## 2021-08-25 DIAGNOSIS — J02.9 ACUTE PHARYNGITIS, UNSPECIFIED ETIOLOGY: Primary | ICD-10-CM

## 2021-08-25 DIAGNOSIS — B07.8 OTHER VIRAL WARTS: ICD-10-CM

## 2021-08-25 PROCEDURE — 17110 DESTRUCTION B9 LES UP TO 14: CPT | Performed by: PREVENTIVE MEDICINE

## 2021-08-25 PROCEDURE — 99207 PR DROP WITH A PROCEDURE: CPT | Performed by: PREVENTIVE MEDICINE

## 2021-08-25 RX ORDER — AZITHROMYCIN 250 MG/1
TABLET, FILM COATED ORAL
Qty: 6 TABLET | Refills: 0 | Status: SHIPPED | OUTPATIENT
Start: 2021-08-25 | End: 2021-08-30

## 2021-08-25 NOTE — LETTER
8/25/2021         RE: Saeid Gaming  [address] [place] [facility] [address]        Dear Colleague,    Thank you for referring your patient, Saeid Gaming, to the [facility] [place]. Please see a copy of my visit note below.    Saeid presents for left knee wart and left 2nd digit viral wart cryotreatment  He has had the knee wart in the past. It has returned. His finger wart is smaller and has been present for the past month      Wart on knee is about 0.5 cm on anterior patellar tendon area  Left finger wart is 0.25cm diameter near DIP joint   All lesions are frozen with LN2 x3. Patient tolerated procedure well.         the warts were frozen easily three times with liquid nitrogen.  A total of 2 warts are treated today.  The etiology of common warts were discussed.   Warm soapy water soaks and sanding also recommended.  The patient is to return every 4 weeks until all warts have     ASSESSMENT:  19 yo male with left knee and left finger  WART for removal     PLAN:  Warts were frozen and wound dressing applied  WART CARE DISCUSSED. USE OF OTC PRODUCT STARTING IN FEW DAYS. GENTLE ABRAISION WITH PUMICE STONE OR EMERY BOARD WITH GOOD HANDWASHING AFTER. RETURN IN 4 WEEKS FOR REFREEZING UNTIL RESOLVED.    Dr Tejada      Again, thank you for allowing me to participate in the care of your patient.        Sincerely,        Heri Tejada MD

## 2021-08-25 NOTE — PROGRESS NOTES
Saeid presents for left knee wart and left 2nd digit viral wart cryotreatment  He has had the knee wart in the past. It has returned. His finger wart is smaller and has been present for the past month      Wart on knee is about 0.5 cm on anterior patellar tendon area  Left finger wart is 0.25cm diameter near DIP joint   All lesions are frozen with LN2 x3. Patient tolerated procedure well.         the warts were frozen easily three times with liquid nitrogen.  A total of 2 warts are treated today.  The etiology of common warts were discussed.   Warm soapy water soaks and sanding also recommended.  The patient is to return every 4 weeks until all warts have     ASSESSMENT:  19 yo male with left knee and left finger  WART for removal     PLAN:  Warts were frozen and wound dressing applied  WART CARE DISCUSSED. USE OF OTC PRODUCT STARTING IN FEW DAYS. GENTLE ABRAISION WITH PUMICE STONE OR EMERY BOARD WITH GOOD HANDWASHING AFTER. RETURN IN 4 WEEKS FOR REFREEZING UNTIL RESOLVED.    Dr Tejada

## 2021-10-10 ENCOUNTER — HEALTH MAINTENANCE LETTER (OUTPATIENT)
Age: 21
End: 2021-10-10

## 2021-11-09 DIAGNOSIS — J30.89 NON-SEASONAL ALLERGIC RHINITIS, UNSPECIFIED TRIGGER: ICD-10-CM

## 2021-11-09 RX ORDER — MONTELUKAST SODIUM 10 MG/1
10 TABLET ORAL AT BEDTIME
Qty: 30 TABLET | Refills: 3 | Status: SHIPPED | OUTPATIENT
Start: 2021-11-09 | End: 2022-02-13

## 2021-11-16 ENCOUNTER — OFFICE VISIT (OUTPATIENT)
Dept: ORTHOPEDICS | Facility: CLINIC | Age: 21
End: 2021-11-16
Payer: COMMERCIAL

## 2021-11-16 VITALS — WEIGHT: 245 LBS | BODY MASS INDEX: 30.62 KG/M2

## 2021-11-16 DIAGNOSIS — M79.641 RIGHT HAND PAIN: Primary | ICD-10-CM

## 2021-11-16 NOTE — LETTER
11/16/2021      RE: Saeid Gaming  [address]  [facility] [address]       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 Has describes some vague numbness over the dorsum of his right hand. Seems to have some numbness that goes into the thumb and index finger on the radial aspect of his right hand on the dorsal side. Little bit of swelling in that area. No specific pain. He is doing everything.    Examination today shows full range of motion of his wrist. Full pronation supination. Normal digital motion. Some tenderness is noted at the second metacarpal phalangeal joint. There is a trace amount of swelling in this area    Fluoroscopic images obtained today show no fractures dislocations. He does show an old injury to the first metacarpal consistent with a stent or lesion in an old UCL avulsion. He has no instability to this area and no pain. It appears chronic.    Clinical assessment: Contusion right hand with subsequent numbness.    Plan: Long discussion with the patient. Reviewed the diagnosis. He is cleared to play. Ice anti-inflammatory rehab as necessary.      Soto Robles MD

## 2021-11-17 NOTE — PROGRESS NOTES
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 Has describes some vague numbness over the dorsum of his right hand. Seems to have some numbness that goes into the thumb and index finger on the radial aspect of his right hand on the dorsal side. Little bit of swelling in that area. No specific pain. He is doing everything.    Examination today shows full range of motion of his wrist. Full pronation supination. Normal digital motion. Some tenderness is noted at the second metacarpal phalangeal joint. There is a trace amount of swelling in this area    Fluoroscopic images obtained today show no fractures dislocations. He does show an old injury to the first metacarpal consistent with a stent or lesion in an old UCL avulsion. He has no instability to this area and no pain. It appears chronic.    Clinical assessment: Contusion right hand with subsequent numbness.    Plan: Long discussion with the patient. Reviewed the diagnosis. He is cleared to play. Ice anti-inflammatory rehab as necessary.

## 2022-01-29 ENCOUNTER — HEALTH MAINTENANCE LETTER (OUTPATIENT)
Age: 22
End: 2022-01-29

## 2022-02-13 DIAGNOSIS — J30.89 NON-SEASONAL ALLERGIC RHINITIS, UNSPECIFIED TRIGGER: ICD-10-CM

## 2022-02-13 RX ORDER — MONTELUKAST SODIUM 10 MG/1
10 TABLET ORAL AT BEDTIME
Qty: 30 TABLET | Refills: 3 | Status: SHIPPED | OUTPATIENT
Start: 2022-02-13 | End: 2022-06-02

## 2022-02-13 RX ORDER — CETIRIZINE HYDROCHLORIDE, PSEUDOEPHEDRINE HYDROCHLORIDE 5; 120 MG/1; MG/1
1 TABLET, FILM COATED, EXTENDED RELEASE ORAL 2 TIMES DAILY
Qty: 60 TABLET | Refills: 3 | Status: SHIPPED | OUTPATIENT
Start: 2022-02-13 | End: 2022-06-30

## 2022-02-23 ENCOUNTER — OFFICE VISIT (OUTPATIENT)
Dept: ORTHOPEDICS | Facility: CLINIC | Age: 22
End: 2022-02-23
Payer: COMMERCIAL

## 2022-02-23 VITALS — WEIGHT: 245 LBS | HEIGHT: 75 IN | BODY MASS INDEX: 30.46 KG/M2

## 2022-02-23 DIAGNOSIS — B07.8 OTHER VIRAL WARTS: Primary | ICD-10-CM

## 2022-02-23 DIAGNOSIS — L85.9 HYPERKERATOSIS: ICD-10-CM

## 2022-02-23 DIAGNOSIS — B07.9 VIRAL WARTS, UNSPECIFIED TYPE: ICD-10-CM

## 2022-02-23 DIAGNOSIS — B07.9 VIRAL WART ON FINGER: ICD-10-CM

## 2022-02-23 PROCEDURE — 99207 PR DROP WITH A PROCEDURE: CPT | Performed by: PREVENTIVE MEDICINE

## 2022-02-23 PROCEDURE — 17110 DESTRUCTION B9 LES UP TO 14: CPT | Performed by: PREVENTIVE MEDICINE

## 2022-02-23 NOTE — LETTER
2/23/2022         RE: Saeid Gaming  [address]  [facility] [address]        Dear Colleague,    Thank you for referring your patient, Saeid Gaming, to the [facility] [place]. Please see a copy of my visit note below.    Saeid presents for left knee wart and left 2nd digit viral wart cryotreatment as it has returned  He has also had knee wart in the past. It has returned. His finger wart is smaller and has been present for the past month      Wart on knee is about 0.5 cm on anterior patellar tendon area  Left finger wart is 0.25cm diameter near DIP joint   All lesions are frozen with LN2 x3. Patient tolerated procedure well.         the warts were frozen easily three times with liquid nitrogen.  A total of 2 warts are treated today.  The etiology of common warts were discussed.   Warm soapy water soaks and sanding also recommended.  The patient is to return every 4 weeks until all warts have    Skin: has two areas of raised hyperkeratotic lesions <0.3cm on right inner arm     ASSESSMENT:  21 yo male with left knee and left finger  WART for removal     PLAN:  Warts were frozen and wound dressing applied  WART CARE DISCUSSED. USE OF OTC PRODUCT STARTING IN FEW DAYS. GENTLE ABRAISION WITH PUMICE STONE OR EMERY BOARD WITH GOOD HANDWASHING AFTER. RETURN IN 4 WEEKS FOR REFREEZING UNTIL RESOLVED.      Dr Tejada      Again, thank you for allowing me to participate in the care of your patient.        Sincerely,        Heri Tejada MD

## 2022-02-23 NOTE — PROGRESS NOTES
Saeid presents for left knee wart and left 2nd digit viral wart cryotreatment as it has returned  He has also had knee wart in the past. It has returned. His finger wart is smaller and has been present for the past month      Wart on knee is about 0.5 cm on anterior patellar tendon area  Left finger wart is 0.25cm diameter near DIP joint   All lesions are frozen with LN2 x3. Patient tolerated procedure well.         the warts were frozen easily three times with liquid nitrogen.  A total of 2 warts are treated today.  The etiology of common warts were discussed.   Warm soapy water soaks and sanding also recommended.  The patient is to return every 4 weeks until all warts have    Skin: has two areas of raised hyperkeratotic lesions <0.3cm on right inner arm     ASSESSMENT:  21 yo male with left knee and left finger  WART for removal     PLAN:  Warts were frozen and wound dressing applied  WART CARE DISCUSSED. USE OF OTC PRODUCT STARTING IN FEW DAYS. GENTLE ABRAISION WITH PUMICE STONE OR EMERY BOARD WITH GOOD HANDWASHING AFTER. RETURN IN 4 WEEKS FOR REFREEZING UNTIL RESOLVED.      Dr Tejada

## 2022-05-30 DIAGNOSIS — J30.89 NON-SEASONAL ALLERGIC RHINITIS, UNSPECIFIED TRIGGER: ICD-10-CM

## 2022-06-02 RX ORDER — MONTELUKAST SODIUM 10 MG/1
TABLET ORAL
Qty: 90 TABLET | Refills: 1 | Status: SHIPPED | OUTPATIENT
Start: 2022-06-02

## 2022-06-26 DIAGNOSIS — J30.89 NON-SEASONAL ALLERGIC RHINITIS, UNSPECIFIED TRIGGER: ICD-10-CM

## 2022-06-30 RX ORDER — CETIRIZINE HCL 10 MG
TABLET ORAL
Qty: 60 TABLET | Refills: 3 | Status: SHIPPED | OUTPATIENT
Start: 2022-06-30 | End: 2022-11-23

## 2022-09-17 ENCOUNTER — HEALTH MAINTENANCE LETTER (OUTPATIENT)
Age: 22
End: 2022-09-17

## 2022-09-25 ENCOUNTER — OFFICE VISIT (OUTPATIENT)
Dept: ORTHOPEDICS | Facility: CLINIC | Age: 22
End: 2022-09-25
Payer: COMMERCIAL

## 2022-09-25 VITALS — BODY MASS INDEX: 30.46 KG/M2 | WEIGHT: 245 LBS | HEIGHT: 75 IN

## 2022-09-25 DIAGNOSIS — M79.646 CHRONIC THUMB PAIN, UNSPECIFIED LATERALITY: Primary | ICD-10-CM

## 2022-09-25 DIAGNOSIS — G89.29 CHRONIC THUMB PAIN, UNSPECIFIED LATERALITY: Primary | ICD-10-CM

## 2022-09-25 NOTE — LETTER
Date:September 27, 2022      Patient was self referred, no letter generated. Do not send.        [facility] Health Information

## 2022-09-25 NOTE — LETTER
9/25/2022      RE: Saeid Gaming  [address] [facility] [address]     Dear Colleague,    Thank you for referring your patient, Saeid Gaming, to the [facility]. Please see a copy of my visit note below.    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  He localizes the pain to the base of the thumb, and approximately the CMC joint.  He iced overnight.  He is not having significant swelling today.  He is not having difficulty with  strength.  He has never had any injury to this before.  He did have a previous bony UCL injury to the MCP joint of the thumb that went on to heal uneventfully.    O:  RUE: No pain to palpation of the distal phalanx proximal phalanx or metacarpal.  Mild pain to palpation globally around the CMC joint.  No pain to palpation over the MCP joint.  5/5 flexion and extension at the MCP, CMC, and IP joint of the thumb.  No instability to varus valgus stress at the MCP joint.  No instability of the CMC joint to varus and valgus stress.    Imaging: Three-view x-ray of the thumb base shows no fracture or acute bony pathology at the base of the metacarpal or of the trapezium.  No dislocation or subluxation noted.  Previous injury to the MCP joint was visualized and is stable.    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  No bony pathology seen on x-ray.  No instability noted.  Patient can return to play as pain allows.  Recommend taping for practice and game play.  Anti-inflammatories and icing.  We will continue to monitor.    Germán Goodwin MD    Patient seen and examined with the fellow. I also personally reviewed the images and interpreted the imaging myself.     Assesment: Thumb contusion, no fracture    Plan: Taper a splint play as able.    I agree with history, physical and imaging as well as the  assessment and plan as detailed by Dr. Goodwin.         Again, thank you for allowing me to participate in the care of your patient.      Sincerely,    Soto Robles MD

## 2022-09-25 NOTE — PROGRESS NOTES
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  He localizes the pain to the base of the thumb, and approximately the CMC joint.  He iced overnight.  He is not having significant swelling today.  He is not having difficulty with  strength.  He has never had any injury to this before.  He did have a previous bony UCL injury to the MCP joint of the thumb that went on to heal uneventfully.    O:  RUE: No pain to palpation of the distal phalanx proximal phalanx or metacarpal.  Mild pain to palpation globally around the CMC joint.  No pain to palpation over the MCP joint.  5/5 flexion and extension at the MCP, CMC, and IP joint of the thumb.  No instability to varus valgus stress at the MCP joint.  No instability of the CMC joint to varus and valgus stress.    Imaging: Three-view x-ray of the thumb base shows no fracture or acute bony pathology at the base of the metacarpal or of the trapezium.  No dislocation or subluxation noted.  Previous injury to the MCP joint was visualized and is stable.    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  No bony pathology seen on x-ray.  No instability noted.  Patient can return to play as pain allows.  Recommend taping for practice and game play.  Anti-inflammatories and icing.  We will continue to monitor.    Germán Goodwin MD

## 2022-09-26 NOTE — PROGRESS NOTES
Patient seen and examined with the fellow. I also personally reviewed the images and interpreted the imaging myself.     Assesment: Thumb contusion, no fracture    Plan: Taper a splint play as able.    I agree with history, physical and imaging as well as the assessment and plan as detailed by Dr. Goodwin.

## 2022-11-06 ENCOUNTER — OFFICE VISIT (OUTPATIENT)
Dept: ORTHOPEDICS | Facility: CLINIC | Age: 22
End: 2022-11-06
Payer: COMMERCIAL

## 2022-11-06 DIAGNOSIS — S53.442A ELBOW SPRAIN, ULNAR COLLATERAL LIGAMENT, LEFT, INITIAL ENCOUNTER: Primary | ICD-10-CM

## 2022-11-06 RX ORDER — KETOROLAC TROMETHAMINE 10 MG/1
10 TABLET, FILM COATED ORAL EVERY 6 HOURS PRN
Qty: 5 TABLET | Refills: 0 | Status: SHIPPED | OUTPATIENT
Start: 2022-11-06

## 2022-11-06 RX ORDER — DICLOFENAC SODIUM 75 MG/1
75 TABLET, DELAYED RELEASE ORAL 2 TIMES DAILY
Qty: 60 TABLET | Refills: 0 | Status: SHIPPED | OUTPATIENT
Start: 2022-11-06

## 2022-11-06 NOTE — LETTER
11/6/2022      RE: Saeid Gaming  [address] [facility] [address]     Dear Colleague,    Thank you for referring your patient, Saeid Gaming, to the [facility]. Please see a copy of my visit note below.    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  He states he injured it in the game yesterday.  During the play in question he has elbow extended when an opposing player hit him on the elbow from the radial side.  He felt the elbow going to a valgus moment.  He denies dislocating the elbow.  He was evaluated by athletic training staff on the sidelines.  He was able to maintain full range of motion and strength without instability.  He was able to complete the remainder of the game.  Today he is having pain on the medial and posterior aspect of the elbow.  He does not feel any gross instability.  He has full range of motion.    O:  RUE: 0 to 150 degrees range of motion of the elbow.  Mild pain to palpation of the tip the olecranon.  Mild pain to palpation over the inferior aspect of the medial epicondyle of the humerus as well as the medial aspect of the ulna.  No pain to palpation over the radial head or neck.  No pain to palpation over the lateral condyle of the humerus.  5/5 flexion and extension.  No gross instability to valgus stress at 0 and 30 degrees elbow flexion although there is guarding at 30 degrees.  No instability varus stress.  Positive milking maneuver for pain and feeling of instability the medial aspect of the elbow.    Imaging:   Three-view x-ray of the right elbow under mini C arm was performed.  This shows reduction of the ulnohumeral joint.  There is also reduction of the radiocapitellar joint on all views.  Normal joint line.  No fracture noted of the olecranon, coronoid, or radial neck.  No acute bony pathology noted.    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  We discussed with the patient the diagnosis and expected outcome.  We discussed with him that given that he is a nonthrowing athlete and not having any instability we would recommend nonoperative management of this injury.  He has full strength and range of motion at this time.  He is cleared to return to play with bracing as he is able to tolerate.  We will order him twice daily Voltaren p.o. as needed for pain.  We will also prescribe him Toradol as needed for game day.  We will continue to monitor him for this injury throughout the remainder of the season.    Germán Goodwin MD    Patient seen and examined with the resident. I also personally reviewed the images and interpreted the imaging myself.     Assesment: right UCL strain    Plan: nsaids, brace, play when able    I agree with history, physical and imaging as well as the assessment and plan as detailed by Dr. Tavarez.         Again, thank you for allowing me to participate in the care of your patient.      Sincerely,    Soto Robles MD

## 2022-11-06 NOTE — LETTER
Date:November 8, 2022      Patient was self referred, no letter generated. Do not send.        [facility] Health Information

## 2022-11-07 NOTE — PROGRESS NOTES
Patient seen and examined with the resident. I also personally reviewed the images and interpreted the imaging myself.     Assesment: right UCL strain    Plan: nsaids, brace, play when able    I agree with history, physical and imaging as well as the assessment and plan as detailed by Dr. Tavarez.

## 2022-11-07 NOTE — PROGRESS NOTES
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  He states he injured it in the game yesterday.  During the play in question he has elbow extended when an opposing player hit him on the elbow from the radial side.  He felt the elbow going to a valgus moment.  He denies dislocating the elbow.  He was evaluated by athletic training staff on the sidelines.  He was able to maintain full range of motion and strength without instability.  He was able to complete the remainder of the game.  Today he is having pain on the medial and posterior aspect of the elbow.  He does not feel any gross instability.  He has full range of motion.    O:  RUE: 0 to 150 degrees range of motion of the elbow.  Mild pain to palpation of the tip the olecranon.  Mild pain to palpation over the inferior aspect of the medial epicondyle of the humerus as well as the medial aspect of the ulna.  No pain to palpation over the radial head or neck.  No pain to palpation over the lateral condyle of the humerus.  5/5 flexion and extension.  No gross instability to valgus stress at 0 and 30 degrees elbow flexion although there is guarding at 30 degrees.  No instability varus stress.  Positive milking maneuver for pain and feeling of instability the medial aspect of the elbow.    Imaging:   Three-view x-ray of the right elbow under mini C arm was performed.  This shows reduction of the ulnohumeral joint.  There is also reduction of the radiocapitellar joint on all views.  Normal joint line.  No fracture noted of the olecranon, coronoid, or radial neck.  No acute bony pathology noted.    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  We discussed with the patient the diagnosis and expected outcome.  We discussed with him that given that he is a nonthrowing athlete and not having any instability we would recommend nonoperative management  of this injury.  He has full strength and range of motion at this time.  He is cleared to return to play with bracing as he is able to tolerate.  We will order him twice daily Voltaren p.o. as needed for pain.  We will also prescribe him Toradol as needed for game day.  We will continue to monitor him for this injury throughout the remainder of the season.    Germán Goodwin MD

## 2022-11-09 ENCOUNTER — OFFICE VISIT (OUTPATIENT)
Dept: ORTHOPEDICS | Facility: CLINIC | Age: 22
End: 2022-11-09
Payer: COMMERCIAL

## 2022-11-09 DIAGNOSIS — M25.561 ACUTE PAIN OF RIGHT KNEE: Primary | ICD-10-CM

## 2022-11-09 NOTE — LETTER
11/9/2022      RE: Saeid Gaming  [address] [facility] [address]     Dear Colleague,    Thank you for referring your patient, Saeid Gaming, to the [facility]. Please see a copy of my visit note below.    Saeid Gaming is a very pleasant 22-year-old male who had an injury at practice.  Seems to be a hyperextension event.  Was able to return to play some though his knee has been sore.  Did not feel a pop.    Examination shows a pleasant male no acute distress he is articulate and interactive.  Visual inspection shows no redness or drainage no suggestion of infection.  Trace effusion.  Stable to varus valgus stress testing.  Stable anterior posterior drawer testing.  No pivot shift.  Lachman 0.    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  Was able to return to practice    Plan: He is cleared to play when able.  I think his ligaments are stable.  Continue to receive treatment through the training room.  We will only need to get an MRI if he should have lingering symptoms.      Again, thank you for allowing me to participate in the care of your patient.      Sincerely,    Soto Robles MD

## 2022-11-09 NOTE — LETTER
Date:November 16, 2022      Patient was self referred, no letter generated. Do not send.        [facility] Health Information

## 2022-11-15 NOTE — PROGRESS NOTES
Saeid Gaming is a very pleasant 22-year-old male who had an injury at practice.  Seems to be a hyperextension event.  Was able to return to play some though his knee has been sore.  Did not feel a pop.    Examination shows a pleasant male no acute distress he is articulate and interactive.  Visual inspection shows no redness or drainage no suggestion of infection.  Trace effusion.  Stable to varus valgus stress testing.  Stable anterior posterior drawer testing.  No pivot shift.  Lachman 0.    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  Was able to return to practice    Plan: He is cleared to play when able.  I think his ligaments are stable.  Continue to receive treatment through the training room.  We will only need to get an MRI if he should have lingering symptoms.

## 2022-11-23 DIAGNOSIS — J30.89 NON-SEASONAL ALLERGIC RHINITIS, UNSPECIFIED TRIGGER: ICD-10-CM

## 2022-11-25 NOTE — TELEPHONE ENCOUNTER
cetirizine-pseudoePHEDrine ER ([facility] ALLERGY RELIEF-D) 5-120 MG 12 hr tablet  Last Written Prescription Date:  6/30/2022  Last Fill Quantity: 60,   # refills: 3  Last Office Visit : 11/9/2022  Future Office visit:  None    Routing refill request to provider for review/approval because:  Drug not on the [facility], P or [facility] refill protocol or controlled substance    Zarina Cavanaugh RN  Central Triage Red Flags/Med Refills

## 2022-11-28 RX ORDER — CETIRIZINE HYDROCHLORIDE, PSEUDOEPHEDRINE HYDROCHLORIDE 5; 120 MG/1; MG/1
1 TABLET, FILM COATED, EXTENDED RELEASE ORAL 2 TIMES DAILY
Qty: 60 TABLET | Refills: 1 | Status: SHIPPED | OUTPATIENT
Start: 2022-11-28 | End: 2023-02-01

## 2023-01-28 DIAGNOSIS — J30.89 NON-SEASONAL ALLERGIC RHINITIS, UNSPECIFIED TRIGGER: ICD-10-CM

## 2023-01-30 NOTE — TELEPHONE ENCOUNTER
Prescription refill requested for:   Cetirizine-pseudoePHEDrine ER ([facility] ALLERGY RELIEF-D) 5-120 MG 12 hr tablet      Last Written Prescription Date:  11/28/22  Last Fill Quantity:60 ,   # refills: 1  Last Office Visit: 2/23/22  Future Office visit:           Chandrakant Carreno, EMT

## 2023-02-01 RX ORDER — CETIRIZINE HCL 10 MG
TABLET ORAL
Qty: 60 TABLET | Refills: 1 | Status: SHIPPED | OUTPATIENT
Start: 2023-02-01

## 2023-05-06 ENCOUNTER — HEALTH MAINTENANCE LETTER (OUTPATIENT)
Age: 23
End: 2023-05-06

## 2024-06-07 NOTE — ADDENDUM NOTE
Addended by: PRABHA LIMA on: 11/13/2018 08:16 AM     Modules accepted: Orders    
Addended by: PRABHA LIMA on: 11/13/2018 10:02 AM     Modules accepted: Orders    
Yes

## 2024-07-14 ENCOUNTER — HEALTH MAINTENANCE LETTER (OUTPATIENT)
Age: 24
End: 2024-07-14

## 2025-07-19 ENCOUNTER — HEALTH MAINTENANCE LETTER (OUTPATIENT)
Age: 25
End: 2025-07-19